# Patient Record
Sex: FEMALE | Race: WHITE | NOT HISPANIC OR LATINO | URBAN - METROPOLITAN AREA
[De-identification: names, ages, dates, MRNs, and addresses within clinical notes are randomized per-mention and may not be internally consistent; named-entity substitution may affect disease eponyms.]

---

## 2021-02-26 ENCOUNTER — IMPORTED ENCOUNTER (OUTPATIENT)
Dept: URBAN - METROPOLITAN AREA CLINIC 50 | Facility: CLINIC | Age: 66
End: 2021-02-26

## 2021-03-12 ENCOUNTER — IMPORTED ENCOUNTER (OUTPATIENT)
Dept: URBAN - METROPOLITAN AREA CLINIC 50 | Facility: CLINIC | Age: 66
End: 2021-03-12

## 2021-03-12 NOTE — PATIENT DISCUSSION
"""ST spoke to patient to let her know that OD does not meet insurnace qualification.  If wants to ""

## 2021-03-30 ENCOUNTER — IMPORTED ENCOUNTER (OUTPATIENT)
Dept: URBAN - METROPOLITAN AREA CLINIC 50 | Facility: CLINIC | Age: 66
End: 2021-03-30

## 2021-04-06 ENCOUNTER — IMPORTED ENCOUNTER (OUTPATIENT)
Dept: URBAN - METROPOLITAN AREA CLINIC 50 | Facility: CLINIC | Age: 66
End: 2021-04-06

## 2021-04-17 ASSESSMENT — TONOMETRY
OS_IOP_MMHG: 16
OD_IOP_MMHG: 15

## 2021-04-17 ASSESSMENT — VISUAL ACUITY
OS_CC: 20/20
OD_CC: 20/60 ECC

## 2021-05-13 ENCOUNTER — PREPPED CHART (OUTPATIENT)
Dept: URBAN - METROPOLITAN AREA CLINIC 52 | Facility: CLINIC | Age: 66
End: 2021-05-13

## 2021-05-14 ENCOUNTER — PRE-OP - (OUTPATIENT)
Dept: URBAN - METROPOLITAN AREA CLINIC 52 | Facility: CLINIC | Age: 66
End: 2021-05-14

## 2021-05-14 VITALS — HEIGHT: 55 IN | DIASTOLIC BLOOD PRESSURE: 79 MMHG | SYSTOLIC BLOOD PRESSURE: 138 MMHG

## 2021-05-14 DIAGNOSIS — H02.831: ICD-10-CM

## 2021-05-14 DIAGNOSIS — H02.834: ICD-10-CM

## 2021-05-14 PROCEDURE — PREOP PRE OP VISIT

## 2021-05-14 ASSESSMENT — VISUAL ACUITY: OS_CC: 20/25

## 2021-05-25 ENCOUNTER — SURGERY/PROCEDURE (OUTPATIENT)
Dept: URBAN - METROPOLITAN AREA SURGERY 16 | Facility: SURGERY | Age: 66
End: 2021-05-25

## 2021-05-25 DIAGNOSIS — H02.831: ICD-10-CM

## 2021-05-25 DIAGNOSIS — H02.834: ICD-10-CM

## 2021-05-25 PROCEDURE — 15823 BLEPHARP UPR EYELID XCSV SKN: CPT

## 2021-06-04 ENCOUNTER — PROBLEM (OUTPATIENT)
Dept: URBAN - METROPOLITAN AREA CLINIC 53 | Facility: CLINIC | Age: 66
End: 2021-06-04

## 2021-06-04 DIAGNOSIS — H10.13: ICD-10-CM

## 2021-06-04 PROCEDURE — 92012 INTRM OPH EXAM EST PATIENT: CPT

## 2021-06-04 RX ORDER — TOBRAMYCIN AND DEXAMETHASONE 1; 3 MG/ML; MG/ML
1 SUSPENSION/ DROPS OPHTHALMIC
Start: 2021-06-04

## 2021-06-04 ASSESSMENT — TONOMETRY
OS_IOP_MMHG: 12
OD_IOP_MMHG: 16

## 2021-06-04 ASSESSMENT — VISUAL ACUITY
OS_SC: 20/30+2
OD_SC: CF 2FT

## 2021-06-04 NOTE — PATIENT DISCUSSION
Acute allergic conjunctivitis, OU. Advised patient she is not contagious as I do not believe this is bacterial due to the discharge being white and not yellow. Discontinue Gentamicin drops. Will start patient on Tobradex one drop in each eye four times a day for seven days. If patient does not improve any by Monday patient to call. Patient asked about her stitches post blepharoplasty and advised that they will be taken out at her next appointment with Dr. Diana Medina. Patient to keep appointment with Dr. Diana Medina on 6/11/2021.

## 2021-06-11 ENCOUNTER — 2 WEEK POST-OP (OUTPATIENT)
Dept: URBAN - METROPOLITAN AREA CLINIC 52 | Facility: CLINIC | Age: 66
End: 2021-06-11

## 2021-06-11 DIAGNOSIS — Z98.890: ICD-10-CM

## 2021-06-11 PROCEDURE — 99024 POSTOP FOLLOW-UP VISIT: CPT

## 2021-06-11 ASSESSMENT — VISUAL ACUITY
OS_SC: 20/25-1
OD_SC: CF 3FT

## 2021-06-11 ASSESSMENT — TONOMETRY
OD_IOP_MMHG: 20
OS_IOP_MMHG: 14

## 2021-06-11 NOTE — PATIENT DISCUSSION
IOP OU increased secondary to using Tobradex. All conjunctivitis cleared at this time.  Questionable allergic reaction to Erythromycin Tata.

## 2021-06-11 NOTE — PATIENT DISCUSSION
1 Week PO: Healing well. Sutures removed in-office today. Instructed patient to continue using Vaseline for 3 more nights, then discontinue.

## 2021-11-19 ENCOUNTER — COMPREHENSIVE EXAM (OUTPATIENT)
Dept: URBAN - METROPOLITAN AREA CLINIC 52 | Facility: CLINIC | Age: 66
End: 2021-11-19

## 2021-11-19 DIAGNOSIS — H25.811: ICD-10-CM

## 2021-11-19 DIAGNOSIS — H25.12: ICD-10-CM

## 2021-11-19 DIAGNOSIS — H35.351: ICD-10-CM

## 2021-11-19 DIAGNOSIS — H52.4: ICD-10-CM

## 2021-11-19 PROCEDURE — 92015 DETERMINE REFRACTIVE STATE: CPT

## 2021-11-19 PROCEDURE — 92014 COMPRE OPH EXAM EST PT 1/>: CPT

## 2021-11-19 PROCEDURE — 92134 CPTRZ OPH DX IMG PST SGM RTA: CPT

## 2021-11-19 ASSESSMENT — VISUAL ACUITY
OS_CC: 20/30+2
OD_GLARE: >20/400
OS_PH: 20/25-2
OS_GLARE: 20/25
OS_GLARE: 20/30
OU_CC: J1 @16"

## 2021-11-19 ASSESSMENT — TONOMETRY
OS_IOP_MMHG: 12
OD_IOP_MMHG: 14

## 2021-12-01 ENCOUNTER — BIOMETRY (OUTPATIENT)
Dept: URBAN - METROPOLITAN AREA CLINIC 52 | Facility: CLINIC | Age: 66
End: 2021-12-01

## 2021-12-01 DIAGNOSIS — H25.12: ICD-10-CM

## 2021-12-01 DIAGNOSIS — H25.811: ICD-10-CM

## 2021-12-01 PROCEDURE — TOPOIOL CORNEAL TOPOGRAPHY-PREMIUM IOL

## 2021-12-01 PROCEDURE — 92136 OPHTHALMIC BIOMETRY: CPT

## 2021-12-01 ASSESSMENT — KERATOMETRY
OD_K1POWER_DIOPTERS: 45.00
OD_K2POWER_DIOPTERS: 44.50
OS_K1POWER_DIOPTERS: 45.25
OD_AXISANGLE2_DEGREES: 73
OS_K2POWER_DIOPTERS: 44.12
OS_AXISANGLE2_DEGREES: 5
OD_AXISANGLE_DEGREES: 163
OS_AXISANGLE_DEGREES: 95

## 2021-12-01 NOTE — PATIENT DISCUSSION
SURGICAL COORDINATOR NOTES: OD FIRST. MAC OCT DONE 11/19/21. NEED BSCAN OU @ PRE-OP. DILATE @ PRE-OP.

## 2021-12-06 ENCOUNTER — PRE-OP/H&P (OUTPATIENT)
Dept: URBAN - METROPOLITAN AREA CLINIC 53 | Facility: CLINIC | Age: 66
End: 2021-12-06

## 2021-12-06 DIAGNOSIS — H25.811: ICD-10-CM

## 2021-12-06 DIAGNOSIS — H25.12: ICD-10-CM

## 2021-12-06 PROCEDURE — PREOP PRE OP VISIT

## 2021-12-06 PROCEDURE — 76512 OPH US DX B-SCAN: CPT

## 2021-12-06 ASSESSMENT — KERATOMETRY
OS_AXISANGLE2_DEGREES: 5
OS_K1POWER_DIOPTERS: 45.25
OD_AXISANGLE2_DEGREES: 73
OD_K1POWER_DIOPTERS: 45.00
OS_K2POWER_DIOPTERS: 44.12
OD_K2POWER_DIOPTERS: 44.50
OS_AXISANGLE_DEGREES: 95
OD_AXISANGLE_DEGREES: 163

## 2021-12-06 ASSESSMENT — VISUAL ACUITY
OD_SC: CF 1FT
OS_SC: 20/30+2

## 2021-12-06 ASSESSMENT — TONOMETRY
OD_IOP_MMHG: 19
OS_IOP_MMHG: 19

## 2021-12-06 NOTE — PATIENT DISCUSSION
CATARACT SURGERY PLANNER - STANDARD IOL/NO FEMTO: Phacoemulsification with IOL: Eye: right|DOS: 12/15/21|Model: [id number]|Power: 22. 5|Target: dist|Visc: duet|Omidria: yes|10% Phenylephrine: no|Epi-shugarcaine: yes|Phaco Setting: dense|BSS+: no|Trypan Blue: no|CTR: no|Olive Tip: no|Atropine: no|Pupilloplasty: no|Notes: Hx of CRVO.

## 2021-12-15 ENCOUNTER — SURGERY/PROCEDURE (OUTPATIENT)
Dept: URBAN - METROPOLITAN AREA SURGERY 16 | Facility: SURGERY | Age: 66
End: 2021-12-15

## 2021-12-15 ENCOUNTER — POST-OP (OUTPATIENT)
Dept: URBAN - METROPOLITAN AREA CLINIC 52 | Facility: CLINIC | Age: 66
End: 2021-12-15

## 2021-12-15 DIAGNOSIS — H25.811: ICD-10-CM

## 2021-12-15 DIAGNOSIS — Z96.1: ICD-10-CM

## 2021-12-15 DIAGNOSIS — Z98.41: ICD-10-CM

## 2021-12-15 PROCEDURE — 66984 XCAPSL CTRC RMVL W/O ECP: CPT

## 2021-12-15 PROCEDURE — 99024 POSTOP FOLLOW-UP VISIT: CPT

## 2021-12-15 ASSESSMENT — VISUAL ACUITY: OD_SC: 20/400

## 2021-12-15 ASSESSMENT — KERATOMETRY
OS_K2POWER_DIOPTERS: 44.12
OD_AXISANGLE_DEGREES: 163
OS_K1POWER_DIOPTERS: 45.25
OS_AXISANGLE2_DEGREES: 5
OS_K2POWER_DIOPTERS: 44.12
OS_AXISANGLE_DEGREES: 95
OS_AXISANGLE_DEGREES: 95
OD_AXISANGLE2_DEGREES: 73
OS_AXISANGLE2_DEGREES: 5
OD_K2POWER_DIOPTERS: 44.50
OD_K1POWER_DIOPTERS: 45.00
OD_K1POWER_DIOPTERS: 45.00
OS_K1POWER_DIOPTERS: 45.25
OD_AXISANGLE_DEGREES: 163
OD_AXISANGLE2_DEGREES: 73
OD_K2POWER_DIOPTERS: 44.50

## 2021-12-15 ASSESSMENT — TONOMETRY: OD_IOP_MMHG: 27

## 2021-12-23 ENCOUNTER — PRE-OP/H&P (OUTPATIENT)
Dept: URBAN - METROPOLITAN AREA CLINIC 50 | Facility: CLINIC | Age: 66
End: 2021-12-23

## 2021-12-23 DIAGNOSIS — H25.12: ICD-10-CM

## 2021-12-23 DIAGNOSIS — Z98.41: ICD-10-CM

## 2021-12-23 DIAGNOSIS — Z96.1: ICD-10-CM

## 2021-12-23 PROCEDURE — 99024 POSTOP FOLLOW-UP VISIT: CPT

## 2021-12-23 PROCEDURE — 92136 OPHTHALMIC BIOMETRY: CPT

## 2021-12-23 RX ORDER — DORZOLAMIDE HYDROCHLORIDE AND TIMOLOL MALEATE 20; 5 MG/ML; MG/ML: 1 SOLUTION/ DROPS OPHTHALMIC TWICE A DAY

## 2021-12-23 ASSESSMENT — VISUAL ACUITY
OD_CC: 20/800
OD_SC: 20/400
OU_SC: 20/30-1
OS_SC: 20/40

## 2021-12-23 ASSESSMENT — KERATOMETRY
OD_AXISANGLE_DEGREES: 163
OS_AXISANGLE_DEGREES: 95
OD_K2POWER_DIOPTERS: 44.50
OS_AXISANGLE2_DEGREES: 5
OS_K2POWER_DIOPTERS: 44.12
OS_K1POWER_DIOPTERS: 45.25
OD_AXISANGLE2_DEGREES: 73
OD_K1POWER_DIOPTERS: 45.00

## 2021-12-23 ASSESSMENT — TONOMETRY
OD_IOP_MMHG: 28
OS_IOP_MMHG: 16
OD_IOP_MMHG: 26

## 2021-12-23 NOTE — PATIENT DISCUSSION
CATARACT SURGERY PLANNER - STANDARD IOL/NO FEMTO: Phacoemulsification with IOL: Eye: left|DOS: 12/29/21|Model: [id number]|Power: 22. 5|Target: dist|Visc: duet|Omidria: yes|10% Phenylephrine: no|Epi-shugarcaine: yes|Phaco Setting: std|BSS+: no|Trypan Blue: no|CTR: no|Olive Tip: no|Atropine: no|Pupilloplasty: no|Notes: no.

## 2021-12-29 ENCOUNTER — SURGERY/PROCEDURE (OUTPATIENT)
Dept: URBAN - METROPOLITAN AREA SURGERY 16 | Facility: SURGERY | Age: 66
End: 2021-12-29

## 2021-12-29 ENCOUNTER — POST-OP (OUTPATIENT)
Dept: URBAN - METROPOLITAN AREA CLINIC 48 | Facility: CLINIC | Age: 66
End: 2021-12-29

## 2021-12-29 DIAGNOSIS — H25.12: ICD-10-CM

## 2021-12-29 DIAGNOSIS — Z96.1: ICD-10-CM

## 2021-12-29 DIAGNOSIS — Z98.42: ICD-10-CM

## 2021-12-29 PROCEDURE — 99024 POSTOP FOLLOW-UP VISIT: CPT

## 2021-12-29 PROCEDURE — 66984 XCAPSL CTRC RMVL W/O ECP: CPT

## 2021-12-29 ASSESSMENT — KERATOMETRY
OS_AXISANGLE_DEGREES: 95
OD_AXISANGLE_DEGREES: 163
OS_AXISANGLE2_DEGREES: 5
OD_AXISANGLE2_DEGREES: 73
OD_AXISANGLE_DEGREES: 163
OS_AXISANGLE2_DEGREES: 5
OD_K2POWER_DIOPTERS: 44.50
OD_AXISANGLE2_DEGREES: 73
OD_K1POWER_DIOPTERS: 45.00
OS_AXISANGLE_DEGREES: 95
OD_K2POWER_DIOPTERS: 44.50
OS_K2POWER_DIOPTERS: 44.12
OS_K2POWER_DIOPTERS: 44.12
OS_K1POWER_DIOPTERS: 45.25
OS_K1POWER_DIOPTERS: 45.25
OD_K1POWER_DIOPTERS: 45.00

## 2021-12-29 ASSESSMENT — VISUAL ACUITY: OS_SC: 20/60

## 2021-12-29 ASSESSMENT — TONOMETRY: OS_IOP_MMHG: 15

## 2022-02-11 ENCOUNTER — POST-OP (OUTPATIENT)
Dept: URBAN - METROPOLITAN AREA CLINIC 53 | Facility: CLINIC | Age: 67
End: 2022-02-11

## 2022-02-11 DIAGNOSIS — Z98.42: ICD-10-CM

## 2022-02-11 DIAGNOSIS — Z98.41: ICD-10-CM

## 2022-02-11 DIAGNOSIS — H34.8110: ICD-10-CM

## 2022-02-11 PROCEDURE — 92134 CPTRZ OPH DX IMG PST SGM RTA: CPT

## 2022-02-11 PROCEDURE — 99024 POSTOP FOLLOW-UP VISIT: CPT

## 2022-02-11 PROCEDURE — 92250 FUNDUS PHOTOGRAPHY W/I&R: CPT

## 2022-02-11 RX ORDER — LIFITEGRAST 50 MG/ML
1 SOLUTION/ DROPS OPHTHALMIC TWICE A DAY
Start: 2022-02-11

## 2022-02-11 ASSESSMENT — KERATOMETRY
OS_K1POWER_DIOPTERS: 44.50
OD_AXISANGLE_DEGREES: 153
OS_K2POWER_DIOPTERS: 45.75
OD_K1POWER_DIOPTERS: 45.00
OD_AXISANGLE2_DEGREES: 63
OS_AXISANGLE_DEGREES: 9
OD_K2POWER_DIOPTERS: 44.50
OS_AXISANGLE2_DEGREES: 99

## 2022-02-11 ASSESSMENT — VISUAL ACUITY
OU_SC: J3
OD_SC: 20/300
OS_SC: 20/20
OD_PH: 20/300

## 2022-02-11 ASSESSMENT — TONOMETRY
OS_IOP_MMHG: 20
OD_IOP_MMHG: 20

## 2022-02-11 NOTE — PATIENT DISCUSSION
Patient to continue using Oasis Tears. Provided patient with samples of Derl Slacaroline. Patient to being Derl Slay 1gtt BID, OU. If patient feels improvement with Derl Slay she will call us to send an Rx. Provided patient with samples of Refresh Relieva artificial tears.

## 2022-02-11 NOTE — PATIENT DISCUSSION
High IOP 20, OU. Patient to continue Cosopt 1gtt BID, OU. Rx sent into [facility].

## 2022-02-11 NOTE — PATIENT DISCUSSION
Patient was last seen in October and has a follow up appointment scheduled this month. 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 Will send todays finding to Dr. Tonia Garzon to see if they want to bring patient in sooner if she is to keep appointment already scheduled.

## 2022-05-20 ENCOUNTER — ESTABLISHED PATIENT (OUTPATIENT)
Dept: URBAN - METROPOLITAN AREA CLINIC 53 | Facility: CLINIC | Age: 67
End: 2022-05-20

## 2022-05-20 DIAGNOSIS — H34.8110: ICD-10-CM

## 2022-05-20 DIAGNOSIS — H40.053: ICD-10-CM

## 2022-05-20 DIAGNOSIS — H20.043: ICD-10-CM

## 2022-05-20 PROCEDURE — 92014 COMPRE OPH EXAM EST PT 1/>: CPT

## 2022-05-20 RX ORDER — PREDNISOLONE ACETATE 10 MG/ML: 1 SUSPENSION/ DROPS OPHTHALMIC

## 2022-05-20 ASSESSMENT — KERATOMETRY
OD_AXISANGLE2_DEGREES: 63
OS_K1POWER_DIOPTERS: 44.50
OS_AXISANGLE_DEGREES: 9
OD_AXISANGLE_DEGREES: 153
OS_K2POWER_DIOPTERS: 45.75
OD_K1POWER_DIOPTERS: 45.00
OD_K2POWER_DIOPTERS: 44.50
OS_AXISANGLE2_DEGREES: 99

## 2022-05-20 ASSESSMENT — VISUAL ACUITY
OD_SC: CF 6FT
OU_SC: 20/20
OS_SC: 20/20
OU_CC: J1+

## 2022-05-20 ASSESSMENT — TONOMETRY
OS_IOP_MMHG: 16
OD_IOP_MMHG: 20

## 2022-05-20 NOTE — PATIENT DISCUSSION
Patient saw Dr. John Davis on 5/6/22 and notated she is stable. She will be scheduled with him again in 6 months for a follow up appointment. Advised to keep any all appointments with him.

## 2022-05-20 NOTE — PATIENT DISCUSSION
Discussed that she has residual inflammation from her cataract surgery. Will start her on Prednisolone acetate in both eyes three times a day for one week, two times a day for one week and one time a day for one week. Will see her back in four weeks to check for resolution.

## 2022-05-20 NOTE — PATIENT DISCUSSION
High IOP 20, OU. Patient to continue Cosopt 1gtt BID, OU. Rx sent into [facility].

## 2022-05-20 NOTE — PATIENT DISCUSSION
She tries not to put any make up around her eyes. She rubs her eyes often through out the day and does not wish to rub it into her eyes. Discussed that she has make up in her tear film and at the base of her lashes. Advised to use a concealer to keep it from getting into her eyes.